# Patient Record
Sex: MALE | Race: WHITE | NOT HISPANIC OR LATINO | URBAN - METROPOLITAN AREA
[De-identification: names, ages, dates, MRNs, and addresses within clinical notes are randomized per-mention and may not be internally consistent; named-entity substitution may affect disease eponyms.]

---

## 2018-05-07 ENCOUNTER — EMERGENCY (EMERGENCY)
Facility: HOSPITAL | Age: 73
LOS: 1 days | Discharge: LEFT BEFORE DISCHARGE | End: 2018-05-07
Attending: EMERGENCY MEDICINE | Admitting: EMERGENCY MEDICINE
Payer: MEDICARE

## 2018-05-07 VITALS
HEART RATE: 74 BPM | RESPIRATION RATE: 16 BRPM | TEMPERATURE: 98 F | SYSTOLIC BLOOD PRESSURE: 166 MMHG | OXYGEN SATURATION: 98 % | DIASTOLIC BLOOD PRESSURE: 74 MMHG

## 2018-05-07 DIAGNOSIS — Z98.890 OTHER SPECIFIED POSTPROCEDURAL STATES: Chronic | ICD-10-CM

## 2018-05-07 PROCEDURE — 73080 X-RAY EXAM OF ELBOW: CPT | Mod: 26,RT

## 2018-05-07 PROCEDURE — 73060 X-RAY EXAM OF HUMERUS: CPT | Mod: 26,RT

## 2018-05-07 PROCEDURE — 99284 EMERGENCY DEPT VISIT MOD MDM: CPT

## 2018-05-07 PROCEDURE — 73090 X-RAY EXAM OF FOREARM: CPT | Mod: 26,RT

## 2018-05-07 RX ORDER — FAMOTIDINE 10 MG/ML
20 INJECTION INTRAVENOUS ONCE
Qty: 0 | Refills: 0 | Status: DISCONTINUED | OUTPATIENT
Start: 2018-05-07 | End: 2018-05-11

## 2018-05-07 RX ORDER — IBUPROFEN 200 MG
600 TABLET ORAL ONCE
Qty: 0 | Refills: 0 | Status: DISCONTINUED | OUTPATIENT
Start: 2018-05-07 | End: 2018-05-11

## 2018-05-07 NOTE — ED ADULT TRIAGE NOTE - CHIEF COMPLAINT QUOTE
pt amb to triage s/p mechanical fall approx 1/2 hour ago, landed on b/l hands R elbow gave out and hit elbow, past surgery on R elbow, pt states "feels broken," + radial pulse, increased pain w/ bending arm

## 2018-05-07 NOTE — ED PROVIDER NOTE - PROGRESS NOTE DETAILS
ONESIMO Copeland: Pt signed out to me by juan carlos JONES. Plan to follow up xray results and re eval. ONESIMO Copeland: spoke to radiology resident, gap between ulnar and wrist noted, recommends xray of wrist. Also, right rib fracture seen on humerus xray, recommends chest xray. Pt states he had old right rib fracture and no pain to that area at this time. ONESIMO Copeland: delay in xray. Spoke to pt, and xray tech, pt will be next on the list. ONESIMO Copeland: Pt eloped. Pt was called 3 times for xray and was not found in ED. Called pt at home. Pt states he left, due to not wanting to wait anymore. Discussed with pt to follow up with private orthopedic and pmd. Pt to return to ED if worsening symptoms. ONESIMO Copeland: spoke to radiology resident, gap between ulnar and wrist noted, recommends xray of wrist. Also, right rib fracture seen on humerus xray, recommends chest xray. Pt states he had old right rib fracture and no pain to that area at this time. xray tech called, pt will be brought to xray.

## 2018-05-07 NOTE — ED PROVIDER NOTE - UPPER EXTREMITY EXAM, RIGHT
2+ radial pulse, NVI distally, no snuff box tenderness, no wrist tenderness, right elbow swelling, tenderness at proximal ulnar head, tenderness at the olecranon, minimal tenderness at radial head

## 2018-05-07 NOTE — ED PROVIDER NOTE - OBJECTIVE STATEMENT
71 y/o M w/ PMhx of BPH, GERD and HLD, presents to the ED c/o right elbow pain s/p trip and fall about 1 hr ago. Pain is worse w/ movement. Pt states he was walking, tripped over an extension cord and fell backwards onto outstretched right hand. Pt has hx of right elbow surgery about 1 yr ago. Denies LOC, head trauma or any other complaints.

## 2022-04-18 PROBLEM — Z00.00 ENCOUNTER FOR PREVENTIVE HEALTH EXAMINATION: Status: ACTIVE | Noted: 2022-04-18

## 2022-04-25 ENCOUNTER — APPOINTMENT (OUTPATIENT)
Dept: OTOLARYNGOLOGY | Facility: CLINIC | Age: 77
End: 2022-04-25

## 2024-02-23 ENCOUNTER — NON-APPOINTMENT (OUTPATIENT)
Age: 79
End: 2024-02-23